# Patient Record
(demographics unavailable — no encounter records)

---

## 2024-10-19 NOTE — PHYSICAL EXAM
[Clear] : right tympanic membrane clear [Erythematous Oropharynx] : nonerythematous oropharynx [Clear to Auscultation Bilaterally] : clear to auscultation bilaterally [No Abnormal Lymph Nodes Palpated] : no abnormal lymph nodes palpated [NL] : warm, clear

## 2024-10-19 NOTE — DISCUSSION/SUMMARY
[FreeTextEntry1] : Supportive care discussed. Exposure to steamy bathroom/ shower. Cool mist humidifier in room at night. Elevate HOB with pillow. Exposure to cold/ fresh air for coughing fit.  Monitor for any worsening symptoms or fevers.  All questions answered, reassurance provided. Instructed to follow up as needed with any questions, concerns or worsening symptoms.  Retention Suture Bite Size: 3 mm